# Patient Record
Sex: MALE | Race: WHITE | NOT HISPANIC OR LATINO | URBAN - METROPOLITAN AREA
[De-identification: names, ages, dates, MRNs, and addresses within clinical notes are randomized per-mention and may not be internally consistent; named-entity substitution may affect disease eponyms.]

---

## 2018-03-16 ENCOUNTER — OUTPATIENT (OUTPATIENT)
Dept: OUTPATIENT SERVICES | Facility: HOSPITAL | Age: 58
LOS: 1 days | End: 2018-03-16
Payer: COMMERCIAL

## 2018-03-16 VITALS
SYSTOLIC BLOOD PRESSURE: 140 MMHG | HEIGHT: 73 IN | OXYGEN SATURATION: 98 % | WEIGHT: 190.04 LBS | HEART RATE: 78 BPM | RESPIRATION RATE: 16 BRPM | DIASTOLIC BLOOD PRESSURE: 90 MMHG | TEMPERATURE: 98 F

## 2018-03-16 DIAGNOSIS — M27.8 OTHER SPECIFIED DISEASES OF JAWS: ICD-10-CM

## 2018-03-16 DIAGNOSIS — M87.9 OSTEONECROSIS, UNSPECIFIED: Chronic | ICD-10-CM

## 2018-03-16 LAB
BLD GP AB SCN SERPL QL: NEGATIVE — SIGNIFICANT CHANGE UP
BUN SERPL-MCNC: 19 MG/DL — SIGNIFICANT CHANGE UP (ref 7–23)
CALCIUM SERPL-MCNC: 9.3 MG/DL — SIGNIFICANT CHANGE UP (ref 8.4–10.5)
CHLORIDE SERPL-SCNC: 102 MMOL/L — SIGNIFICANT CHANGE UP (ref 98–107)
CO2 SERPL-SCNC: 25 MMOL/L — SIGNIFICANT CHANGE UP (ref 22–31)
CREAT SERPL-MCNC: 0.96 MG/DL — SIGNIFICANT CHANGE UP (ref 0.5–1.3)
GLUCOSE SERPL-MCNC: 93 MG/DL — SIGNIFICANT CHANGE UP (ref 70–99)
HCT VFR BLD CALC: 47.3 % — SIGNIFICANT CHANGE UP (ref 39–50)
HGB BLD-MCNC: 16.1 G/DL — SIGNIFICANT CHANGE UP (ref 13–17)
MCHC RBC-ENTMCNC: 29.4 PG — SIGNIFICANT CHANGE UP (ref 27–34)
MCHC RBC-ENTMCNC: 34 % — SIGNIFICANT CHANGE UP (ref 32–36)
MCV RBC AUTO: 86.3 FL — SIGNIFICANT CHANGE UP (ref 80–100)
NRBC # FLD: 0 — SIGNIFICANT CHANGE UP
PLATELET # BLD AUTO: 279 K/UL — SIGNIFICANT CHANGE UP (ref 150–400)
PMV BLD: 9.4 FL — SIGNIFICANT CHANGE UP (ref 7–13)
POTASSIUM SERPL-MCNC: 4.3 MMOL/L — SIGNIFICANT CHANGE UP (ref 3.5–5.3)
POTASSIUM SERPL-SCNC: 4.3 MMOL/L — SIGNIFICANT CHANGE UP (ref 3.5–5.3)
RBC # BLD: 5.48 M/UL — SIGNIFICANT CHANGE UP (ref 4.2–5.8)
RBC # FLD: 12.4 % — SIGNIFICANT CHANGE UP (ref 10.3–14.5)
RH IG SCN BLD-IMP: NEGATIVE — SIGNIFICANT CHANGE UP
SODIUM SERPL-SCNC: 141 MMOL/L — SIGNIFICANT CHANGE UP (ref 135–145)
WBC # BLD: 6.32 K/UL — SIGNIFICANT CHANGE UP (ref 3.8–10.5)
WBC # FLD AUTO: 6.32 K/UL — SIGNIFICANT CHANGE UP (ref 3.8–10.5)

## 2018-03-16 PROCEDURE — 93010 ELECTROCARDIOGRAM REPORT: CPT

## 2018-03-16 RX ORDER — SODIUM CHLORIDE 9 MG/ML
1000 INJECTION, SOLUTION INTRAVENOUS
Qty: 0 | Refills: 0 | Status: DISCONTINUED | OUTPATIENT
Start: 2018-03-29 | End: 2018-04-13

## 2018-03-16 NOTE — H&P PST ADULT - RS GEN PE MLT RESP DETAILS PC
no chest wall tenderness/no rales/airway patent/breath sounds equal/no rhonchi/no wheezes/good air movement/respirations non-labored/clear to auscultation bilaterally

## 2018-03-16 NOTE — H&P PST ADULT - NEGATIVE OPHTHALMOLOGIC SYMPTOMS
no loss of vision R/no diplopia/no photophobia/no blurred vision R/no discharge R/no pain L/no pain R/no loss of vision L/no discharge L

## 2018-03-16 NOTE — H&P PST ADULT - MUSCULOSKELETAL
details… detailed exam normal strength/no joint warmth/no joint erythema/no calf tenderness/no joint swelling/ROM intact

## 2018-03-16 NOTE — H&P PST ADULT - ATTENDING COMMENTS
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  risks and benefits reviewed with patient and consent obtained.

## 2018-03-16 NOTE — H&P PST ADULT - NEGATIVE NEUROLOGICAL SYMPTOMS
no confusion/no headache/no hemiparesis/no facial palsy/no difficulty walking/no vertigo/no loss of sensation/no loss of consciousness/no tremors/no weakness/no generalized seizures/no focal seizures/no paresthesias/no transient paralysis

## 2018-03-16 NOTE — H&P PST ADULT - PROBLEM SELECTOR PLAN 1
Pt is scheduled for debridement of right mandible for 3/29/18. Preop instructions, pepcid provided. Pt stated understanding. Pt instructed take synthroid on the morning of procedure with a sip of water. Procedure confirmed with [facility] surgical coordinator, Right mandible debridement.

## 2018-03-16 NOTE — H&P PST ADULT - FAMILY HISTORY
Father  Still living? No  Family history of heart failure, Age at diagnosis: Age Unknown  Family history of diabetes mellitus, Age at diagnosis: Age Unknown     Mother  Still living? No  Family history of diabetes mellitus, Age at diagnosis: Age Unknown

## 2018-03-16 NOTE — H&P PST ADULT - ENMT COMMENTS
tonsillar cancer, right mandible osteonecrosis, decreased opening of mouth, decreased saliva limited opening of mouth, slight tenderness over right mandible

## 2018-03-16 NOTE — H&P PST ADULT - PMH
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

## 2018-03-16 NOTE — H&P PST ADULT - HISTORY OF PRESENT ILLNESS
57 year old male presents to presurgical testing with diagnosis of other specified diseases of jaws scheduled for debridement of right mandible, removal of right maxillary plate for 3/29/18. 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 Pt complaining of limited opening of mouth from radiation, decreased saliva, and occasional dysphagia. Pt is currently treated with pentoxifylline and doxycycline by surgeon. 57 year old male presents to presurgical testing with diagnosis of other specified diseases of jaws scheduled for debridement of right mandible for 3/29/18. 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 Pt complaining of limited opening of mouth from radiation, decreased saliva, and occasional dysphagia. Pt is currently treated with pentoxifylline and doxycycline by surgeon.

## 2018-03-28 ENCOUNTER — TRANSCRIPTION ENCOUNTER (OUTPATIENT)
Age: 58
End: 2018-03-28

## 2018-03-28 NOTE — ASU PATIENT PROFILE, ADULT - PSH
Basal cell carcinoma of nose  MOHS procedure in 2008  Osteonecrosis  Debridment of right mandible 2013  PEG (percutaneous endoscopic gastrostomy) adjustment/replacement/removal  inserted for radiation therapy, removed post treatment.

## 2018-03-29 ENCOUNTER — OUTPATIENT (OUTPATIENT)
Dept: OUTPATIENT SERVICES | Facility: HOSPITAL | Age: 58
LOS: 1 days | Discharge: HOME | End: 2018-03-29
Payer: COMMERCIAL

## 2018-03-29 ENCOUNTER — RESULT REVIEW (OUTPATIENT)
Age: 58
End: 2018-03-29

## 2018-03-29 VITALS
HEART RATE: 73 BPM | RESPIRATION RATE: 18 BRPM | SYSTOLIC BLOOD PRESSURE: 124 MMHG | DIASTOLIC BLOOD PRESSURE: 77 MMHG | OXYGEN SATURATION: 97 %

## 2018-03-29 VITALS
DIASTOLIC BLOOD PRESSURE: 89 MMHG | SYSTOLIC BLOOD PRESSURE: 176 MMHG | TEMPERATURE: 98 F | HEIGHT: 73 IN | WEIGHT: 190.04 LBS | HEART RATE: 79 BPM | OXYGEN SATURATION: 100 % | RESPIRATION RATE: 16 BRPM

## 2018-03-29 DIAGNOSIS — M87.9 OSTEONECROSIS, UNSPECIFIED: Chronic | ICD-10-CM

## 2018-03-29 DIAGNOSIS — M27.8 OTHER SPECIFIED DISEASES OF JAWS: ICD-10-CM

## 2018-03-29 LAB — RH IG SCN BLD-IMP: NEGATIVE — SIGNIFICANT CHANGE UP

## 2018-03-29 PROCEDURE — 88311 DECALCIFY TISSUE: CPT | Mod: 26

## 2018-03-29 PROCEDURE — 88305 TISSUE EXAM BY PATHOLOGIST: CPT | Mod: 26

## 2018-03-29 RX ORDER — IBUPROFEN 200 MG
1 TABLET ORAL
Qty: 0 | Refills: 0 | COMMUNITY
Start: 2018-03-29 | End: 2018-04-03

## 2018-03-29 RX ORDER — PENTOXIFYLLINE 400 MG
1 TABLET, EXTENDED RELEASE ORAL
Qty: 0 | Refills: 0 | COMMUNITY

## 2018-03-29 RX ORDER — CHLORHEXIDINE GLUCONATE 213 G/1000ML
15 SOLUTION TOPICAL
Qty: 0 | Refills: 0 | COMMUNITY
End: 2018-04-05

## 2018-03-29 RX ORDER — VITAMIN E 100 UNIT
1 CAPSULE ORAL
Qty: 0 | Refills: 0 | COMMUNITY

## 2018-03-29 RX ORDER — LEVOTHYROXINE SODIUM 125 MCG
1 TABLET ORAL
Qty: 0 | Refills: 0 | COMMUNITY

## 2018-03-29 RX ORDER — OXYCODONE HYDROCHLORIDE 5 MG/1
1 TABLET ORAL
Qty: 0 | Refills: 0 | COMMUNITY
End: 2018-04-01

## 2018-03-29 NOTE — ASU DISCHARGE PLAN (ADULT/PEDIATRIC). - INSTRUCTIONS
Soft diet until follow-up with Dr. Cheng. Avoid spicy, crunchy, hot foods. Pleas call [phone] if you have questions about your appointment.

## 2018-03-29 NOTE — H&P ADULT - NSHPPHYSICALEXAM_GEN_ALL_CORE
PE: Gen: NAD  EOE: (-) LAD (-) Facial edema/erythema (-) trismus, (-) cutaneous fistula  IOE:  (-) vestibular/lingual edema, (-) pus or discharge, Edentulous areas noted, FOM soft/NT,   CV: RRR  Pulm: CTA b/l  Ext: MIRZA x4

## 2018-03-29 NOTE — H&P ADULT - HISTORY OF PRESENT ILLNESS
56 yo M with h/o RT and ORN presents for debridement of right mandible in OR with Dr. Cheng. Denies f/c, n/v, dyspnea/dysphagia.

## 2018-03-29 NOTE — ASU DISCHARGE PLAN (ADULT/PEDIATRIC). - MEDICATION SUMMARY - MEDICATIONS TO TAKE
I will START or STAY ON the medications listed below when I get home from the hospital:    ibuprofen 600 mg oral tablet  -- 1 tab(s) by mouth every 6 hours, As Needed for Mild Pain  -- Indication: For Mild pain - patient already has prescriptions    oxyCODONE  -- 1 tab(s) by mouth every 6 hours, As Needed for Severe Pain  -- Indication: For Severe pain - patient already has prescriptions    doxycycline hyclate 100 mg oral capsule  -- 1 cap(s) by mouth 2 times a day  -- Indication: For Home medication    Peridex 0.12% mucous membrane liquid  -- 15 milliliter(s) mucous membrane 2 times a day, rinse for 30 seconds and spit, do not swallow  -- Indication: For Mouthrinse - patient already has prescriptions    pentoxifylline 400 mg oral tablet, extended release  -- 1 tab(s) by mouth 2 times a day  -- Indication: For Home medication    Synthroid 112 mcg (0.112 mg) oral tablet  -- 1 tab(s) by mouth once a day  -- Indication: For Home medication    vitamin E 400 intl units oral capsule  -- 1 cap(s) by mouth 2 times a day  last dose on 3/21/18  -- Indication: For Home medication

## 2018-03-29 NOTE — H&P ADULT - ASSESSMENT
A/P: 56 yo M with h/o RT and ORN presents for debridement of right mandible in OR with Dr. Cheng  -To OR with Dr. Cheng

## 2018-03-29 NOTE — ASU DISCHARGE PLAN (ADULT/PEDIATRIC). - NOTIFY
Excessive Diarrhea/Bleeding that does not stop/Persistent Nausea and Vomiting/Inability to Tolerate Liquids or Foods/Swelling that continues/Pain not relieved by Medications/Fever greater than 101

## 2018-03-29 NOTE — ASU DISCHARGE PLAN (ADULT/PEDIATRIC). - SPECIAL INSTRUCTIONS
Soft diet until follow-up with Dr. Cheng. Avoid spicy, crunchy, hot foods. Please attend 1 week follow-up with Dr. Cheng as appointed. Soft diet until follow-up with Dr. Cheng. Avoid spicy, crunchy, hot foods. Please attend 1 week follow-up with Dr. Cheng as appointed.    Information about recovery given.

## 2018-03-29 NOTE — ASU DISCHARGE PLAN (ADULT/PEDIATRIC). - ITEMS TO FOLLOWUP WITH YOUR PHYSICIAN'S
Soft diet until follow-up with Dr. Cheng. Avoid spicy, crunchy, hot foods. Please attend 1 week follow-up with Dr. Cheng as appointed.

## 2020-10-14 NOTE — ASU PREOP CHECKLIST - NEEDLE GAUGE
20
Is This A New Presentation, Or A Follow-Up?: Skin Lesion
How Severe Is Your Skin Lesion?: mild
Has Your Skin Lesion Been Treated?: not been treated
Additional History: Was flat, now it is raised up more and developed brown pigment

## 2022-02-08 ENCOUNTER — TRANSCRIPTION ENCOUNTER (OUTPATIENT)
Age: 62
End: 2022-02-08

## 2022-02-19 ENCOUNTER — TRANSCRIPTION ENCOUNTER (OUTPATIENT)
Age: 62
End: 2022-02-19

## 2022-02-26 ENCOUNTER — TRANSCRIPTION ENCOUNTER (OUTPATIENT)
Age: 62
End: 2022-02-26

## 2023-03-27 NOTE — H&P PST ADULT - EXTREMITIES
Interval History:  No acute events overnight. Feels better today. Continuing steroids. Meeting need for rehab, but his partner is due on Thursday so he will need to be availble for the delivery.     Review of Systems   All other systems reviewed and are negative.  Objective:     Vital Signs (Most Recent):  Temp: 97.8 °F (36.6 °C) (03/27/23 1148)  Pulse: 74 (03/27/23 1148)  Resp: 18 (03/27/23 1148)  BP: 118/65 (03/27/23 1148)  SpO2: 98 % (03/27/23 1148)   Vital Signs (24h Range):  Temp:  [97.3 °F (36.3 °C)-98.1 °F (36.7 °C)] 97.8 °F (36.6 °C)  Pulse:  [] 74  Resp:  [17-18] 18  SpO2:  [96 %-99 %] 98 %  BP: ()/(63-72) 118/65     Weight: 75 kg (165 lb 5.5 oz)  Body mass index is 21.82 kg/m².    Intake/Output Summary (Last 24 hours) at 3/27/2023 1205  Last data filed at 3/27/2023 0809  Gross per 24 hour   Intake 120 ml   Output --   Net 120 ml        Physical Exam  Vitals reviewed.   Constitutional:       Appearance: Normal appearance.   HENT:      Head: Normocephalic and atraumatic.      Nose: Nose normal.      Mouth/Throat:      Mouth: Mucous membranes are moist.   Eyes:      Pupils: Pupils are equal, round, and reactive to light.   Cardiovascular:      Rate and Rhythm: Normal rate and regular rhythm.      Pulses: Normal pulses.      Heart sounds: Normal heart sounds. No murmur heard.    No friction rub. No gallop.   Pulmonary:      Effort: Pulmonary effort is normal. No respiratory distress.      Breath sounds: Normal breath sounds.   Abdominal:      General: Abdomen is flat. Bowel sounds are normal. There is no distension.      Palpations: Abdomen is soft.      Tenderness: There is no abdominal tenderness.   Musculoskeletal:         General: No swelling. Normal range of motion.      Cervical back: Normal range of motion and neck supple.   Skin:     General: Skin is warm and dry.   Neurological:      Mental Status: He is alert and oriented to person, place, and time.      Comments: Mild residual left-sided  weakness   Psychiatric:         Mood and Affect: Mood normal.         Behavior: Behavior normal.         Thought Content: Thought content normal.         Judgment: Judgment normal.       Significant Labs: All pertinent labs within the past 24 hours have been reviewed.    Significant Imaging: I have reviewed all pertinent imaging results/findings within the past 24 hours.   detailed exam

## 2023-05-31 NOTE — H&P PST ADULT - NEUROLOGICAL DETAILS
Rituxan Counseling:  I discussed with the patient the risks of Rituxan infusions. Side effects can include infusion reactions, severe drug rashes including mucocutaneous reactions, reactivation of latent hepatitis and other infections and rarely progressive multifocal leukoencephalopathy.  All of the patient's questions and concerns were addressed. alert and oriented x 3

## 2024-01-02 ENCOUNTER — APPOINTMENT (OUTPATIENT)
Dept: ORTHOPEDIC SURGERY | Facility: CLINIC | Age: 64
End: 2024-01-02
Payer: MEDICARE

## 2024-01-02 VITALS — WEIGHT: 170 LBS | HEIGHT: 72 IN | BODY MASS INDEX: 23.03 KG/M2

## 2024-01-02 DIAGNOSIS — E78.00 PURE HYPERCHOLESTEROLEMIA, UNSPECIFIED: ICD-10-CM

## 2024-01-02 DIAGNOSIS — I10 ESSENTIAL (PRIMARY) HYPERTENSION: ICD-10-CM

## 2024-01-02 DIAGNOSIS — Z85.818 PERSONAL HISTORY OF MALIGNANT NEOPLASM OF OTHER SITES OF LIP, ORAL CAVITY, AND PHARYNX: ICD-10-CM

## 2024-01-02 DIAGNOSIS — Z85.828 PERSONAL HISTORY OF OTHER MALIGNANT NEOPLASM OF SKIN: ICD-10-CM

## 2024-01-02 PROBLEM — M27.8 OTHER SPECIFIED DISEASES OF JAWS: Chronic | Status: ACTIVE | Noted: 2018-03-16

## 2024-01-02 PROCEDURE — 20610 DRAIN/INJ JOINT/BURSA W/O US: CPT | Mod: RT

## 2024-01-02 PROCEDURE — 99204 OFFICE O/P NEW MOD 45 MIN: CPT | Mod: 25

## 2024-01-02 PROCEDURE — J3490M: CUSTOM | Mod: NC

## 2024-01-02 PROCEDURE — 73030 X-RAY EXAM OF SHOULDER: CPT | Mod: RT

## 2024-01-02 NOTE — IMAGING
[de-identified] : Right shoulder:  limited ROM in all planes due to pain +impingement +anterior shoulder pain no instability NVI  xrays right shoulder: large calcium deposit noted

## 2024-01-02 NOTE — PROCEDURE
Subjective   Patient presents to ER with pain, swelling hands. He has been shooting up suboxone and nerve pills, and wants help quitting.        History provided by:  Patient  Drug / Alcohol Assessment   This is a new problem. The problem has not changed since onset.Associated symptoms include nausea.       Review of Systems   Constitutional: Positive for activity change, appetite change, chills and fatigue.   HENT: Negative.    Eyes: Negative.    Respiratory: Negative.    Cardiovascular: Negative.    Gastrointestinal: Positive for nausea.   Endocrine: Negative.    Genitourinary: Negative.    Musculoskeletal:        Swelling, tender hands   Allergic/Immunologic: Negative.    Neurological: Negative.    Hematological: Negative.    Psychiatric/Behavioral: Negative.        History reviewed. No pertinent past medical history.    No Known Allergies    History reviewed. No pertinent surgical history.    History reviewed. No pertinent family history.    Social History     Social History   • Marital status: Single     Social History Main Topics   • Drug use: Yes     Types: IV, Methamphetamines      Comment: suboxone     Other Topics Concern   • Not on file           Objective   Physical Exam   Constitutional: He appears well-developed and well-nourished.   HENT:   Head: Normocephalic and atraumatic.   Eyes: Pupils are equal, round, and reactive to light. EOM are normal.   Neck: Normal range of motion.   Cardiovascular: Normal rate and regular rhythm.    Pulmonary/Chest: Effort normal.   Neurological: He is alert.   Skin:   Skin reddened and tender in areas of injection with inflammation   Nursing note and vitals reviewed.      Procedures           ED Course  ED Course as of Oct 21 0030   Fri Oct 12, 2018   2321 Mild Cellulitis. No abscess.    Patient eloped before these results could be relayed to the patient and before antibiotics were given. CT Upper Extremity Left Without Contrast [EG]   2323 Patient states that if I am  not going to give him pain medication he is going to leave.  I explained to him that due to his suboxone abuse, THC abuse, benzo abuse and opiate abuse and wish to detox that I can only provide nonnarcotic medications. The patient states that this is a waste of his time.    [EG]      ED Course User Index  [EG] Pattie Shukla,                   MDM      Final diagnoses:   Polysubstance abuse (CMS/HCC)   Opiate abuse, continuous (CMS/HCC)   Cellulitis of left hand            Phu Goncalves MD  10/12/18 1937       Phu Goncalves MD  10/21/18 0030     [Large Joint Injection] : Large joint injection [Right] : of the right [Subacromial Space] : subacromial space [Pain] : pain [Inflammation] : inflammation [Alcohol] : alcohol [Betadine] : betadine [___ cc    0.25%] : Bupivacaine (Marcaine) ~Vcc of 0.25%  [___ cc    80mg] : Methylprednisolone (Depomedrol) ~Vcc of 80 mg  [] : Patient tolerated procedure well [Call if redness, pain or fever occur] : call if redness, pain or fever occur [Apply ice for 15min out of every hour for the next 12-24 hours as tolerated] : apply ice for 15 minutes out of every hour for the next 12-24 hours as tolerated [Patient was advised to rest the joint(s) for ____ days] : patient was advised to rest the joint(s) for [unfilled] days [Previous OTC use and PT nontherapeutic] : patient has tried OTC's including aspirin, Ibuprofen, Aleve, etc or prescription NSAIDS, and/or exercises at home and/or physical therapy without satisfactory response [Patient had decreased mobility in the joint] : patient had decreased mobility in the joint [Risks, benefits, alternatives discussed / Verbal consent obtained] : the risks benefits, and alternatives have been discussed, and verbal consent was obtained

## 2024-01-02 NOTE — HISTORY OF PRESENT ILLNESS
[8] : 8 [0] : 0 [Localized] : localized [Sharp] : sharp [Intermittent] : intermittent [de-identified] : 63 YM with right shoulder pain for about 1 month.  Having trouble raising his arm due to pain. [] : Post Surgical Visit: no [FreeTextEntry1] : Right shoulder [FreeTextEntry5] : Patient felt a twinge in his right shoulder ripping a door off about 1 month ago, pain level has increased within the past 1 week, difficulty lifting the right arm. no prior hx [de-identified] : activity

## 2024-01-12 ENCOUNTER — APPOINTMENT (OUTPATIENT)
Dept: ORTHOPEDIC SURGERY | Facility: CLINIC | Age: 64
End: 2024-01-12
Payer: MEDICARE

## 2024-01-12 VITALS — HEIGHT: 72 IN | BODY MASS INDEX: 23.03 KG/M2 | WEIGHT: 170 LBS

## 2024-01-12 DIAGNOSIS — Z86.39 PERSONAL HISTORY OF OTHER ENDOCRINE, NUTRITIONAL AND METABOLIC DISEASE: ICD-10-CM

## 2024-01-12 PROCEDURE — 73030 X-RAY EXAM OF SHOULDER: CPT | Mod: RT

## 2024-01-12 PROCEDURE — 99204 OFFICE O/P NEW MOD 45 MIN: CPT

## 2024-01-12 PROCEDURE — 73010 X-RAY EXAM OF SHOULDER BLADE: CPT | Mod: RT

## 2024-01-12 RX ORDER — ATORVASTATIN CALCIUM 80 MG/1
TABLET, FILM COATED ORAL
Refills: 0 | Status: ACTIVE | COMMUNITY

## 2024-01-12 RX ORDER — METHYLPREDNISOLONE 4 MG/1
4 TABLET ORAL
Qty: 1 | Refills: 0 | Status: ACTIVE | COMMUNITY
Start: 2024-01-12 | End: 1900-01-01

## 2024-01-12 RX ORDER — AMLODIPINE BESYLATE 5 MG/1
TABLET ORAL
Refills: 0 | Status: ACTIVE | COMMUNITY

## 2024-01-12 RX ORDER — LEVOTHYROXINE SODIUM 137 UG/1
TABLET ORAL
Refills: 0 | Status: ACTIVE | COMMUNITY

## 2024-01-12 NOTE — PHYSICAL EXAM
[Right] : right shoulder [Sitting] : sitting [Mild] : mild [4 ___] : forward flexion 4[unfilled]/5 [5___] : external rotation 5[unfilled]/5 [FreeTextEntry8] : This is sore, though less. [] : no sensory deficits [TWNoteComboBox4] : passive forward flexion 150 degrees [TWNoteComboBox6] : internal rotation L1 [de-identified] : external rotation 60 degrees

## 2024-01-12 NOTE — IMAGING
[Right] : right shoulder [FreeTextEntry1] : The calcium is much less.  The GH is OK.  There are AC spurs. [FreeTextEntry5] : There is a Type II acromion.

## 2024-01-12 NOTE — REASON FOR VISIT
[FreeTextEntry2] : This is a 63 year old RHD M with right shoulder pain that started without injury in December 2023 without injury or history.  His pain progressed, and he saw Usman WONG on 1/2/24.  He had an injection and started PT on 1/8/23.  He feels markedly improved.  Sleeping is fine.  Reaching is better.  There is some weakness.  No numbness or medications.

## 2024-01-12 NOTE — ASSESSMENT
[FreeTextEntry1] : We reviewed the findings and the history. Questions were answered and concerns addressed. The options were outlined. PT will continue. He feels 90+% better. A MDP is planned to allow him to better participate in PT. Repeat x-rays at follow up in 4-6 weeks.   Patient was seen by Dr. Valente Patel. Patient was seen by Priscilla RANDHAWA under the supervision of Dr. Valente Patel. Progress note was completed by Priscilla RANDHAWA. Entered by Karl Landeros acting as scribe.

## 2024-01-12 NOTE — HISTORY OF PRESENT ILLNESS
[de-identified] : 64 yo RHD M here for right shoulder pain that began over 1 month ago without injury. Was seen at [facility] on 1/2/24, had x-rays, given CSI which gave good relief. Has been going to PT.

## 2024-02-12 ENCOUNTER — APPOINTMENT (OUTPATIENT)
Dept: ORTHOPEDIC SURGERY | Facility: CLINIC | Age: 64
End: 2024-02-12

## 2024-02-26 ENCOUNTER — APPOINTMENT (OUTPATIENT)
Dept: ORTHOPEDIC SURGERY | Facility: CLINIC | Age: 64
End: 2024-02-26
Payer: MEDICARE

## 2024-02-26 DIAGNOSIS — M75.31 CALCIFIC TENDINITIS OF RIGHT SHOULDER: ICD-10-CM

## 2024-02-26 PROCEDURE — 99214 OFFICE O/P EST MOD 30 MIN: CPT

## 2024-02-26 PROCEDURE — 73030 X-RAY EXAM OF SHOULDER: CPT | Mod: RT

## 2024-02-26 NOTE — ASSESSMENT
[FreeTextEntry1] : He has made nice gains. More PT and HEP for strength advised. He will call for follow up.  Questions answered.  Patient seen by Valente Patel M.D. Entered by Harleen Pichardo acting as scribe.

## 2024-02-26 NOTE — HISTORY OF PRESENT ILLNESS
[de-identified] : Here for right shoulder follow up. Doing well. Continuing PT - last session scheduled for today.

## 2024-02-26 NOTE — REASON FOR VISIT
[FreeTextEntry2] : This is a 63 year old RHD M with right shoulder pain that started without injury in December 2023 without injury or history.  His pain progressed, and he saw Usman WONG on 1/2/24.  He had an injection and started PT on 1/8/23.  He feels markedly improved.  Sleeping is fine.  Reaching is better.  There is some weakness.  No numbness or medications. The MDP and PT helped a lot. He feels markedly improved.

## 2024-02-26 NOTE — PHYSICAL EXAM
[Right] : right shoulder [Sitting] : sitting [5___] : external rotation 5[unfilled]/5 [5 ___] : forward flexion 5[unfilled]/5 [] : no sensory deficits [FreeTextEntry8] : This is sore, though less. [de-identified] : This is trace. [TWNoteComboBox4] : passive forward flexion 150 degrees [de-identified] : external rotation 60 degrees [TWNoteComboBox6] : internal rotation L1

## 2024-04-25 NOTE — CONSULT LETTER
1 [Dear  ___] : Dear  [unfilled], [Please see my note below.] : Please see my note below. [Consult Letter:] : I had the pleasure of evaluating your patient, [unfilled]. [Consult Closing:] : Thank you very much for allowing me to participate in the care of this patient.  If you have any questions, please do not hesitate to contact me. [Sincerely,] : Sincerely, [FreeTextEntry3] : Valente Patel M.D. Shoulder Surgery